# Patient Record
Sex: MALE | Race: BLACK OR AFRICAN AMERICAN
[De-identification: names, ages, dates, MRNs, and addresses within clinical notes are randomized per-mention and may not be internally consistent; named-entity substitution may affect disease eponyms.]

---

## 2023-05-03 ENCOUNTER — DASHBOARD ENCOUNTERS (OUTPATIENT)
Age: 48
End: 2023-05-03

## 2023-05-03 ENCOUNTER — LAB OUTSIDE AN ENCOUNTER (OUTPATIENT)
Dept: URBAN - METROPOLITAN AREA CLINIC 115 | Facility: CLINIC | Age: 48
End: 2023-05-03

## 2023-05-03 ENCOUNTER — OFFICE VISIT (OUTPATIENT)
Dept: URBAN - METROPOLITAN AREA CLINIC 115 | Facility: CLINIC | Age: 48
End: 2023-05-03
Payer: COMMERCIAL

## 2023-05-03 ENCOUNTER — WEB ENCOUNTER (OUTPATIENT)
Dept: URBAN - METROPOLITAN AREA CLINIC 115 | Facility: CLINIC | Age: 48
End: 2023-05-03

## 2023-05-03 VITALS
BODY MASS INDEX: 37.03 KG/M2 | DIASTOLIC BLOOD PRESSURE: 87 MMHG | TEMPERATURE: 98.7 F | SYSTOLIC BLOOD PRESSURE: 139 MMHG | HEIGHT: 72 IN | WEIGHT: 273.4 LBS | HEART RATE: 80 BPM

## 2023-05-03 DIAGNOSIS — R06.83 SNORINGS: ICD-10-CM

## 2023-05-03 DIAGNOSIS — R19.8 CHANGE IN BOWEL FUNCTION: ICD-10-CM

## 2023-05-03 DIAGNOSIS — Z12.11 COLON CANCER SCREENING: ICD-10-CM

## 2023-05-03 DIAGNOSIS — R14.0 BLOATING: ICD-10-CM

## 2023-05-03 PROBLEM — 116289008: Status: ACTIVE | Noted: 2023-05-03

## 2023-05-03 PROBLEM — 88111009: Status: ACTIVE | Noted: 2023-05-03

## 2023-05-03 PROBLEM — 162031009: Status: ACTIVE | Noted: 2023-05-03

## 2023-05-03 PROCEDURE — 99204 OFFICE O/P NEW MOD 45 MIN: CPT | Performed by: INTERNAL MEDICINE

## 2023-05-03 NOTE — HPI-TODAY'S VISIT:
INDIGESTION AND BURNING, FOR LONG TIMES, TRIED NO MEDICATION, TAKES GASX AND IT HELP  NO DYSPHAGIA. NO N/V  BM IN AM, THEN HAD TO GO BACK AGAIN 2-3 TIMES, MORE FOR 2-3 YEARS.  NO BLEEDING, NO ABODMINAL PAIN,  FEEL BLOATED , POST PRANDIAL  NO FHCC/FHCP  MOTHER HAD BREAST CANCER  NO HEART OR LUNG ISSUE  DOES SNORE.

## 2023-06-13 ENCOUNTER — OFFICE VISIT (OUTPATIENT)
Dept: URBAN - METROPOLITAN AREA SURGERY CENTER 13 | Facility: SURGERY CENTER | Age: 48
End: 2023-06-13

## 2023-06-15 ENCOUNTER — OFFICE VISIT (OUTPATIENT)
Dept: URBAN - METROPOLITAN AREA SURGERY CENTER 13 | Facility: SURGERY CENTER | Age: 48
End: 2023-06-15

## 2023-06-29 ENCOUNTER — CLAIMS CREATED FROM THE CLAIM WINDOW (OUTPATIENT)
Dept: URBAN - METROPOLITAN AREA CLINIC 4 | Facility: CLINIC | Age: 48
End: 2023-06-29
Payer: COMMERCIAL

## 2023-06-29 ENCOUNTER — OFFICE VISIT (OUTPATIENT)
Dept: URBAN - METROPOLITAN AREA SURGERY CENTER 13 | Facility: SURGERY CENTER | Age: 48
End: 2023-06-29
Payer: COMMERCIAL

## 2023-06-29 DIAGNOSIS — K21.9 GASTRO-ESOPHAGEAL REFLUX DISEASE WITHOUT ESOPHAGITIS: ICD-10-CM

## 2023-06-29 DIAGNOSIS — K29.60 OTHER GASTRITIS WITHOUT BLEEDING: ICD-10-CM

## 2023-06-29 DIAGNOSIS — R12 HEARTBURN: ICD-10-CM

## 2023-06-29 DIAGNOSIS — K63.89 OTHER SPECIFIED DISEASES OF INTESTINE: ICD-10-CM

## 2023-06-29 DIAGNOSIS — K21.00 ESOPHAGITIS, REFLUX: ICD-10-CM

## 2023-06-29 DIAGNOSIS — Z12.11 COLON CANCER SCREENING: ICD-10-CM

## 2023-06-29 DIAGNOSIS — B96.81 BACTERIAL INFECTION DUE TO H. PYLORI: ICD-10-CM

## 2023-06-29 DIAGNOSIS — B96.81 HELICOBACTER PYLORI [H. PYLORI] AS THE CAUSE OF DISEASES CLASSIFIED ELSEWHERE: ICD-10-CM

## 2023-06-29 PROBLEM — 40719004: Status: ACTIVE | Noted: 2023-06-29

## 2023-06-29 PROCEDURE — 88341 IMHCHEM/IMCYTCHM EA ADD ANTB: CPT | Performed by: PATHOLOGY

## 2023-06-29 PROCEDURE — 88312 SPECIAL STAINS GROUP 1: CPT | Performed by: PATHOLOGY

## 2023-06-29 PROCEDURE — 88313 SPECIAL STAINS GROUP 2: CPT | Performed by: PATHOLOGY

## 2023-06-29 PROCEDURE — 45380 COLONOSCOPY AND BIOPSY: CPT | Performed by: INTERNAL MEDICINE

## 2023-06-29 PROCEDURE — 43239 EGD BIOPSY SINGLE/MULTIPLE: CPT | Performed by: INTERNAL MEDICINE

## 2023-06-29 PROCEDURE — 88305 TISSUE EXAM BY PATHOLOGIST: CPT | Performed by: PATHOLOGY

## 2023-06-29 PROCEDURE — G8907 PT DOC NO EVENTS ON DISCHARG: HCPCS | Performed by: INTERNAL MEDICINE

## 2023-06-29 PROCEDURE — 88342 IMHCHEM/IMCYTCHM 1ST ANTB: CPT | Performed by: PATHOLOGY

## 2023-06-29 RX ORDER — PANTOPRAZOLE SODIUM 40 MG/1
1 TABLET TABLET, DELAYED RELEASE ORAL ONCE A DAY
Qty: 30 | Refills: 3 | OUTPATIENT
Start: 2023-06-29

## 2023-08-22 ENCOUNTER — WEB ENCOUNTER (OUTPATIENT)
Dept: URBAN - METROPOLITAN AREA CLINIC 82 | Facility: CLINIC | Age: 48
End: 2023-08-22

## 2023-08-22 RX ORDER — PANTOPRAZOLE SODIUM 40 MG/1
1 TABLET TABLET, DELAYED RELEASE ORAL ONCE A DAY
Qty: 30 TABLET | Refills: 3 | OUTPATIENT
Start: 2023-08-25

## 2024-06-05 ENCOUNTER — OFFICE VISIT (OUTPATIENT)
Dept: URBAN - METROPOLITAN AREA CLINIC 111 | Facility: CLINIC | Age: 49
End: 2024-06-05
Payer: COMMERCIAL

## 2024-06-05 VITALS
DIASTOLIC BLOOD PRESSURE: 76 MMHG | SYSTOLIC BLOOD PRESSURE: 128 MMHG | HEART RATE: 80 BPM | WEIGHT: 262 LBS | BODY MASS INDEX: 35.49 KG/M2 | TEMPERATURE: 97.3 F | HEIGHT: 72 IN

## 2024-06-05 DIAGNOSIS — Z86.010 PERSONAL HISTORY OF COLON POLYPS: ICD-10-CM

## 2024-06-05 DIAGNOSIS — K56.7 ILEUS: ICD-10-CM

## 2024-06-05 DIAGNOSIS — B96.81 H. PYLORI: ICD-10-CM

## 2024-06-05 DIAGNOSIS — Z80.0 FAMILY HISTORY OF COLON CANCER: ICD-10-CM

## 2024-06-05 PROCEDURE — 99214 OFFICE O/P EST MOD 30 MIN: CPT | Performed by: PHYSICIAN ASSISTANT

## 2024-06-05 RX ORDER — PANTOPRAZOLE SODIUM 40 MG/1
1 TABLET TABLET, DELAYED RELEASE ORAL TWICE A DAY
Qty: 28 TABLET | Refills: 0 | OUTPATIENT
Start: 2024-06-05

## 2024-06-05 RX ORDER — PANTOPRAZOLE SODIUM 40 MG/1
1 TABLET TABLET, DELAYED RELEASE ORAL ONCE A DAY
Qty: 30 TABLET | Refills: 3 | Status: ACTIVE | COMMUNITY
Start: 2023-08-25

## 2024-06-05 RX ORDER — PANTOPRAZOLE SODIUM 40 MG/1
1 TABLET TABLET, DELAYED RELEASE ORAL ONCE A DAY
Qty: 30 | Refills: 3 | Status: ACTIVE | COMMUNITY
Start: 2023-06-29

## 2024-06-05 RX ORDER — CLARITHROMYCIN 500 MG/1
1 TABLET TABLET, FILM COATED ORAL
Qty: 28 TABLET | Refills: 0 | OUTPATIENT
Start: 2024-06-05 | End: 2024-06-19

## 2024-06-05 RX ORDER — AMOXICILLIN 500 MG/1
2 CAPSULES CAPSULE ORAL TWICE A DAY
Qty: 56 CAPSULE | Refills: 0 | OUTPATIENT
Start: 2024-06-05 | End: 2024-06-19

## 2024-06-05 NOTE — HPI-TODAY'S VISIT:
49 y/o male here after recent constipation and abdominal pain. Seen in 5/2023 by Dr. Billings for colon screening, change in bowel function, and indigestion. Pt had EGD and colon in 6/2023. EGD revealing medium-sized hiatal hernia, LA Grade A reflux esophagitis, esophageal mucosal changes suspicious for short-segment Loredo's, and gastritis. Colon revealing hemorrhoids, moderate diverticulosis in sigmoid, and likely benign polypoid lesion in sigmoid 25 cm proximal to anus. Path revealing h. pylori, reflux-type changes, and hyperplastic polyp. Repeat colon recommended in 3 years. Message stating Rx for Pantoprazole was sent in but do not see that abx got sent.  He had lower abdominal pain starting about 2 weeks ago on work trip in [facility]. He was also constipated which is not normal for him. He took Dulcolax which did not help. He was having the urge to go but nothing was coming out. He then took Mg citrate and had some results. [facility]. Pt was then in [facility] last week for work and went to urgent care there last Thursday. He had x-ray and was told "left side was blocked." He was told to take metamucil and eat high fiber fruit. He was having a little hard stool after that. He finally had more normal stool last weekend after getting home. He has been eating lighter this week. Baseline with stools is once a day even when he travels. No surgical history. He has had fairly normal stools the last few days. Stool has been soft. Pain has resolved. FH of colon cancer in father.  He has not been on PPI and states he did not get any meds for h. pylori. He has had heartburn. He takes Gas-x as needed.